# Patient Record
Sex: FEMALE | Race: BLACK OR AFRICAN AMERICAN | NOT HISPANIC OR LATINO | URBAN - METROPOLITAN AREA
[De-identification: names, ages, dates, MRNs, and addresses within clinical notes are randomized per-mention and may not be internally consistent; named-entity substitution may affect disease eponyms.]

---

## 2018-08-09 ENCOUNTER — EMERGENCY (EMERGENCY)
Facility: HOSPITAL | Age: 23
LOS: 1 days | Discharge: HOME | End: 2018-08-09
Attending: EMERGENCY MEDICINE
Payer: SELF-PAY

## 2018-08-09 VITALS
RESPIRATION RATE: 18 BRPM | TEMPERATURE: 100 F | SYSTOLIC BLOOD PRESSURE: 109 MMHG | HEART RATE: 96 BPM | OXYGEN SATURATION: 97 % | DIASTOLIC BLOOD PRESSURE: 70 MMHG

## 2018-08-09 PROCEDURE — 99282 EMERGENCY DEPT VISIT SF MDM: CPT

## 2018-08-09 PROCEDURE — 99283 EMERGENCY DEPT VISIT LOW MDM: CPT | Mod: 25

## 2018-08-09 PROCEDURE — 99053 MED SERV 10PM-8AM 24 HR FAC: CPT

## 2018-08-09 NOTE — ED PROVIDER NOTE - PROGRESS NOTE DETAILS
Patient decided she could not wait. Did not want labs or imaging done. Offered meds, patient would prefer to leave. No concern for emergent condition requiring immediate intervention. Return as needed

## 2018-08-09 NOTE — ED ADULT NURSE NOTE - NSIMPLEMENTINTERV_GEN_ALL_ED
Implemented All Fall Risk Interventions:  [place] to call system. Call bell, personal items and telephone within reach. Instruct patient to call for assistance. Room bathroom lighting operational. Non-slip footwear when patient is off stretcher. Physically safe environment: no spills, clutter or unnecessary equipment. Stretcher in lowest position, wheels locked, appropriate side rails in place. Provide visual cue, wrist band, yellow gown, etc. Monitor gait and stability. Monitor for mental status changes and reorient to person, place, and time. Review medications for side effects contributing to fall risk. Reinforce activity limits and safety measures with patient and family.

## 2018-08-09 NOTE — ED PROVIDER NOTE - ATTENDING CONTRIBUTION TO CARE
Patricia Freitas MD - Attending Physician: I have personally seen and examined this patient with the resident/fellow.  I have fully participated in the care of this patient. I have reviewed all pertinent clinical information, including history, physical exam, plan and the Resident/Fellow’s note and agree except as noted. See MDM

## 2018-08-09 NOTE — ED PROVIDER NOTE - OBJECTIVE STATEMENT
24yo female no pmh presenting with epigastric pain for 3 days occasionally radiating to right side. Patient afraid to eat, vomited once, occasional nausea, one episode of loose stools. Feels acid taste in mouth at times, feels bloated. Denies fevers, chills.

## 2018-08-09 NOTE — ED PROVIDER NOTE - PHYSICAL EXAMINATION
Gen: No acute distress, alert, cooperative  Head: Normocephalic, Atraumatic  HEENT: normal conjunctiva  Lung: CTAB, no respiratory distress, no crackles or wheezes  CV: rrr, no murmur  Abd: soft, MIld epigastric tenderness, ND, no rebound or guarding  MSK: No LE edema  Neuro: No focal neurologic deficits  Skin: Warm and dry, no evidence of rash   Psych: normal affect, follows commands

## 2018-08-09 NOTE — ED PROVIDER NOTE - MEDICAL DECISION MAKING DETAILS
24yo female no pmh presenting with epigastric pain for 3 days. Possibly gerd. Patient declines all treatment at this time, explained all possible diagnosis, possible workup options. Patient declines, labs, imaging, medications. Scared to miss work in AM, will return if symptoms worsen,. 22yo female no pmh presenting with epigastric pain for 3 days. Possibly gerd. Patient declines all treatment at this time, explained all possible diagnosis, possible workup options. Patient declines, labs, imaging, medications. Scared to miss work in AM, will return if symptoms worsen,.    Patricia Freitas MD - Attending Physician: Pt here with epigastric pain. History c/w gastritis. Declined labs, meds at this time. Discussed follow-up, outpatient care, and return precautions

## 2018-08-09 NOTE — ED ADULT NURSE NOTE - OBJECTIVE STATEMENT
abd pain x3d radiating to rt side, no nvd, no co no sob aox3 maex4 skin intact neuro intact no fever

## 2019-08-31 ENCOUNTER — EMERGENCY (EMERGENCY)
Facility: HOSPITAL | Age: 24
LOS: 1 days | Discharge: HOME | End: 2019-08-31
Attending: EMERGENCY MEDICINE | Admitting: EMERGENCY MEDICINE
Payer: MEDICAID

## 2019-08-31 VITALS
SYSTOLIC BLOOD PRESSURE: 117 MMHG | HEART RATE: 75 BPM | OXYGEN SATURATION: 99 % | TEMPERATURE: 98 F | RESPIRATION RATE: 16 BRPM | DIASTOLIC BLOOD PRESSURE: 69 MMHG

## 2019-08-31 VITALS
DIASTOLIC BLOOD PRESSURE: 52 MMHG | TEMPERATURE: 99 F | SYSTOLIC BLOOD PRESSURE: 101 MMHG | RESPIRATION RATE: 19 BRPM | OXYGEN SATURATION: 98 % | HEART RATE: 60 BPM

## 2019-08-31 PROCEDURE — 76705 ECHO EXAM OF ABDOMEN: CPT | Mod: 26

## 2019-08-31 PROCEDURE — 99284 EMERGENCY DEPT VISIT MOD MDM: CPT

## 2019-08-31 RX ORDER — LIDOCAINE 4 G/100G
5 CREAM TOPICAL ONCE
Refills: 0 | Status: COMPLETED | OUTPATIENT
Start: 2019-08-31 | End: 2019-08-31

## 2019-08-31 RX ORDER — ONDANSETRON 8 MG/1
4 TABLET, FILM COATED ORAL ONCE
Refills: 0 | Status: COMPLETED | OUTPATIENT
Start: 2019-08-31 | End: 2019-08-31

## 2019-08-31 RX ADMIN — ONDANSETRON 4 MILLIGRAM(S): 8 TABLET, FILM COATED ORAL at 20:45

## 2019-08-31 RX ADMIN — Medication 30 MILLILITER(S): at 20:45

## 2019-08-31 RX ADMIN — LIDOCAINE 5 MILLILITER(S): 4 CREAM TOPICAL at 20:45

## 2019-08-31 NOTE — ED PROVIDER NOTE - OBJECTIVE STATEMENT
25 y/o F w/ no pertinent PMH, presents to the ED c/o vague abdominal discomfort localized over epigastric and RUQ area. pt states she had chipotle twice this month w/ abdominal discomfort. Pt requesting work note so she does not have to do heavy lifting at work tomorrow. Pt admits to daily marijuana smoking.

## 2019-08-31 NOTE — ED PROVIDER NOTE - PATIENT PORTAL LINK FT
You can access the FollowMyHealth Patient Portal offered by [facility] by registering at the following website: [email or web address]. By joining [employer]’s FollowMyHealth portal, you will also be able to view your health information using other applications (apps) compatible with our system.

## 2019-08-31 NOTE — ED ADULT NURSE NOTE - OBJECTIVE STATEMENT
pt is A&Ox3, ambulatory, able to make needs known and presents with C/O ab pain with nausea and what PT describes as "muscle spasms". Has intermittent nausea but denies vomiting x 3 days.

## 2019-08-31 NOTE — ED PROVIDER NOTE - CLINICAL SUMMARY MEDICAL DECISION MAKING FREE TEXT BOX
25 y/o F p/w vague abdominal discomfort. Plan: GI cocktail, RUQ ultrasound to r/o ailyn and dc w/ work note.

## 2020-06-11 ENCOUNTER — EMERGENCY (EMERGENCY)
Facility: HOSPITAL | Age: 25
LOS: 1 days | Discharge: HOME | End: 2020-06-11
Attending: EMERGENCY MEDICINE | Admitting: EMERGENCY MEDICINE
Payer: MEDICAID

## 2020-06-11 VITALS
DIASTOLIC BLOOD PRESSURE: 66 MMHG | TEMPERATURE: 98 F | SYSTOLIC BLOOD PRESSURE: 109 MMHG | RESPIRATION RATE: 18 BRPM | OXYGEN SATURATION: 99 % | HEART RATE: 71 BPM

## 2020-06-11 VITALS
RESPIRATION RATE: 18 BRPM | HEART RATE: 59 BPM | SYSTOLIC BLOOD PRESSURE: 109 MMHG | DIASTOLIC BLOOD PRESSURE: 55 MMHG | OXYGEN SATURATION: 99 % | TEMPERATURE: 98 F

## 2020-06-11 PROCEDURE — 99283 EMERGENCY DEPT VISIT LOW MDM: CPT

## 2020-06-11 PROCEDURE — 71046 X-RAY EXAM CHEST 2 VIEWS: CPT | Mod: 26

## 2020-06-11 PROCEDURE — 73562 X-RAY EXAM OF KNEE 3: CPT | Mod: 26,RT

## 2020-06-11 PROCEDURE — 72170 X-RAY EXAM OF PELVIS: CPT | Mod: 26

## 2020-06-11 NOTE — ED PROVIDER NOTE - NSFOLLOWUPINSTRUCTIONS_ED_ALL_ED_FT
Please call or see your doctor or return to the ER if you have new chest pain, shortness of breath, fevers, inability to eat or drink, or worsening of symptoms that brought you to the emergency room today.    Take motrin as needed for pain.     Please follow up with your primary care physician in 1-2 days or as soon as possible.
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
## 2020-06-11 NOTE — ED PROVIDER NOTE - MUSCULOSKELETAL, MLM
No vertebral stepoffs; pelvis stable; small ecchymosis noted to R lateral knee. LE strength and sensation strength intact b/l with hip ROMintact b/l. No flank hematomas noted.
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
## 2020-06-11 NOTE — ED PROVIDER NOTE - PATIENT PORTAL LINK FT
You can access the FollowMyHealth Patient Portal offered by [facility] by registering at the following website: [email or web address]. By joining [employer]’s FollowMyHealth portal, you will also be able to view your health information using other applications (apps) compatible with our system.
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
## 2020-06-11 NOTE — ED ADULT NURSE NOTE - NSIMPLEMENTINTERV_GEN_ALL_ED
Implemented All Fall Risk Interventions:  [place] to call system. Call bell, personal items and telephone within reach. Instruct patient to call for assistance. Room bathroom lighting operational. Non-slip footwear when patient is off stretcher. Physically safe environment: no spills, clutter or unnecessary equipment. Stretcher in lowest position, wheels locked, appropriate side rails in place. Provide visual cue, wrist band, yellow gown, etc. Monitor gait and stability. Monitor for mental status changes and reorient to person, place, and time. Review medications for side effects contributing to fall risk. Reinforce activity limits and safety measures with patient and family.
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
## 2020-09-22 NOTE — ED ADULT NURSE NOTE - MODE OF DISCHARGE
Patient identified as New Medicare Bundle through report  Email with bundle communication tool sent to facility with bundle dates, DRG, and LOS  This care manager assistant will continue to monitor via chart and CarePort review throughout bundle episode 
Ambulatory

## 2022-04-15 ENCOUNTER — OUTPATIENT (OUTPATIENT)
Dept: INPATIENT UNIT | Facility: HOSPITAL | Age: 27
LOS: 1 days | Discharge: HOME | End: 2022-04-15

## 2022-04-15 ENCOUNTER — EMERGENCY (EMERGENCY)
Facility: HOSPITAL | Age: 27
LOS: 1 days | Discharge: STILL ADMITTED OR ADMINISTRATIVE | End: 2022-04-15
Admitting: EMERGENCY MEDICINE
Payer: MEDICAID

## 2022-04-15 VITALS
SYSTOLIC BLOOD PRESSURE: 107 MMHG | HEART RATE: 66 BPM | DIASTOLIC BLOOD PRESSURE: 57 MMHG | TEMPERATURE: 98 F | RESPIRATION RATE: 16 BRPM

## 2022-04-15 VITALS — DIASTOLIC BLOOD PRESSURE: 79 MMHG | SYSTOLIC BLOOD PRESSURE: 126 MMHG | HEART RATE: 60 BPM

## 2022-04-15 VITALS
RESPIRATION RATE: 18 BRPM | OXYGEN SATURATION: 100 % | SYSTOLIC BLOOD PRESSURE: 116 MMHG | HEART RATE: 74 BPM | TEMPERATURE: 98 F | DIASTOLIC BLOOD PRESSURE: 46 MMHG

## 2022-04-15 DIAGNOSIS — O26.899 OTHER SPECIFIED PREGNANCY RELATED CONDITIONS, UNSPECIFIED TRIMESTER: ICD-10-CM

## 2022-04-15 DIAGNOSIS — Z3A.00 WEEKS OF GESTATION OF PREGNANCY NOT SPECIFIED: ICD-10-CM

## 2022-04-15 PROCEDURE — L9996: CPT

## 2022-04-15 PROCEDURE — 99213 OFFICE O/P EST LOW 20 MIN: CPT

## 2022-04-15 NOTE — OB PROVIDER TRIAGE NOTE - NSICDXPASTMEDICALHX_GEN_ALL_CORE_FT
PAST MEDICAL HISTORY:  HSV-1 infection     No pertinent past medical history     No pertinent past medical history

## 2022-04-15 NOTE — OB RN TRIAGE NOTE - NSICDXPASTMEDICALHX_GEN_ALL_CORE_FT
PAST MEDICAL HISTORY:  No pertinent past medical history     No pertinent past medical history      PAST MEDICAL HISTORY:  HSV-1 infection     No pertinent past medical history     No pertinent past medical history

## 2022-04-15 NOTE — OB PROVIDER TRIAGE NOTE - NS_OBGYNHISTORY_OBGYN_ALL_OB_FT
GYN: Yeast infection diagnosed 1 weeks ago. Pt using terconazole cream for treatment, HSV1 as per HIE, patient didn't disclose   OB: Denies    AP course uncomplicated  -GBS Unknown

## 2022-04-15 NOTE — OB PROVIDER TRIAGE NOTE - HISTORY OF PRESENT ILLNESS
28y/o  @39wks presents with leaking of fluid since 5pm and contractions felt every 10mins. Patient is currently taking terconazole intravaginally for a yeast infection, last dose  pm  Reports good fetal movement  Denies VB    Allergies: Denies  Medications: PNV, Terconazole    Denies Medical and Surgical HX  Denies Etoh/Smoke/Drugs/Psy HX

## 2022-04-15 NOTE — ED ADULT TRIAGE NOTE - CHIEF COMPLAINT QUOTE
Pt st" I am 38 weeks due day is April 22, this is my first baby, I started leaking fluid yesterday around 5pm and having contractions every 10 minutes." Pt reports active fetal movement.

## 2022-04-15 NOTE — OB RN TRIAGE NOTE - PATIENT'S GENDER IDENTITY
PCP: Gisela Cleaning MD     Last appt: 3/23/2021   Future Appointments   Date Time Provider Gosia Rogers   6/23/2021 11:30 AM Gisela Cleaning MD [facility]   4/4/2022 11:00 AM Jossy Monahan MD [facility] BS AMB        Requested Prescriptions     Pending Prescriptions Disp Refills    metoprolol succinate (TOPROL-XL) 25 mg XL tablet 90 Tab 3     Sig: Take 1 Tab by mouth daily.  Indications: high blood pressure and heart Female

## 2022-04-15 NOTE — OB PROVIDER TRIAGE NOTE - NSHPPHYSICALEXAM_GEN_ALL_CORE
Vital Signs Last 24 Hrs  T(C): 36.7 (15 Apr 2022 05:16), Max: 36.7 (15 Apr 2022 05:09)  T(F): 98.06 (15 Apr 2022 05:16), Max: 98.1 (15 Apr 2022 05:09)  HR: 66 (15 Apr 2022 05:16) (66 - 74)  BP: 107/57 (15 Apr 2022 05:16) (107/57 - 116/46)  RR: 16 (15 Apr 2022 05:09) (16 - 18)  SpO2: 100% (15 Apr 2022 04:28) (100% - 100%)    Assessment reveals VSS  Abdomen soft, NT, gravid  Cat 1 tracing, ctx every 10 mins  Transabdominal Ultrasound- post placenta, jeff: 9.24, bpp8/8, vtx  Sterile Speculum- cervix appears closed, +leukorrhea in the vault, SSE negative for lesions   Vaginal Exam- 1/40/-3  A&Ox3  Lungs- clear bilateral  Heart- normal rate and rhythm      PLAN: NST Continues

## 2022-04-15 NOTE — OB PROVIDER TRIAGE NOTE - PLAN OF CARE
D/C Home  D/W Dr. Griggs  No evidence of SROM or Labor at this time  Normal fetal testing  Follow up at next scheduled prenatal appointment 4/18  Return for decreased fetal movement, loss of fluid or vaginal bleeding  Increase oral hydration   Signs and Symptoms of labor reviewed

## 2022-04-15 NOTE — OB PROVIDER TRIAGE NOTE - ADDITIONAL INSTRUCTIONS
Follow up at next scheduled prenatal appointment 4/18  Return for decreased fetal movement, loss of fluid or vaginal bleeding  Increase oral hydration   Signs and Symptoms of labor reviewed

## 2022-04-15 NOTE — OB RN TRIAGE NOTE - FALL HARM RISK - UNIVERSAL INTERVENTIONS
Bed in lowest position, wheels locked, appropriate side rails in place/Call bell, personal items and telephone in reach/Instruct patient to call for assistance before getting out of bed or chair/Non-slip footwear when patient is out of bed/[place] to call system/Physically safe environment - no spills, clutter or unnecessary equipment/Purposeful Proactive Rounding/Room/bathroom lighting operational, light cord in reach

## 2022-04-17 ENCOUNTER — INPATIENT (INPATIENT)
Facility: HOSPITAL | Age: 27
LOS: 2 days | Discharge: HOME | End: 2022-04-20
Attending: OBSTETRICS & GYNECOLOGY | Admitting: OBSTETRICS & GYNECOLOGY

## 2022-04-17 VITALS
HEART RATE: 61 BPM | SYSTOLIC BLOOD PRESSURE: 120 MMHG | RESPIRATION RATE: 18 BRPM | DIASTOLIC BLOOD PRESSURE: 77 MMHG | TEMPERATURE: 98 F

## 2022-04-17 DIAGNOSIS — Z3A.00 WEEKS OF GESTATION OF PREGNANCY NOT SPECIFIED: ICD-10-CM

## 2022-04-17 DIAGNOSIS — O26.899 OTHER SPECIFIED PREGNANCY RELATED CONDITIONS, UNSPECIFIED TRIMESTER: ICD-10-CM

## 2022-04-17 PROBLEM — B00.9 HERPESVIRAL INFECTION, UNSPECIFIED: Chronic | Status: ACTIVE | Noted: 2022-04-15

## 2022-04-17 LAB
BASOPHILS # BLD AUTO: 0.02 K/UL — SIGNIFICANT CHANGE UP (ref 0–0.2)
BASOPHILS NFR BLD AUTO: 0.3 % — SIGNIFICANT CHANGE UP (ref 0–2)
BLD GP AB SCN SERPL QL: NEGATIVE — SIGNIFICANT CHANGE UP
COVID-19 SPIKE DOMAIN AB INTERP: POSITIVE
COVID-19 SPIKE DOMAIN ANTIBODY RESULT: 12.2 U/ML — HIGH
EOSINOPHIL # BLD AUTO: 0.02 K/UL — SIGNIFICANT CHANGE UP (ref 0–0.5)
EOSINOPHIL NFR BLD AUTO: 0.3 % — SIGNIFICANT CHANGE UP (ref 0–6)
HCT VFR BLD CALC: 35.9 % — SIGNIFICANT CHANGE UP (ref 34.5–45)
HGB BLD-MCNC: 11.7 G/DL — SIGNIFICANT CHANGE UP (ref 11.5–15.5)
IANC: 4.51 K/UL — SIGNIFICANT CHANGE UP (ref 1.8–7.4)
IMM GRANULOCYTES NFR BLD AUTO: 0.3 % — SIGNIFICANT CHANGE UP (ref 0–1.5)
LYMPHOCYTES # BLD AUTO: 2.3 K/UL — SIGNIFICANT CHANGE UP (ref 1–3.3)
LYMPHOCYTES # BLD AUTO: 32 % — SIGNIFICANT CHANGE UP (ref 13–44)
MCHC RBC-ENTMCNC: 29.3 PG — SIGNIFICANT CHANGE UP (ref 27–34)
MCHC RBC-ENTMCNC: 32.6 GM/DL — SIGNIFICANT CHANGE UP (ref 32–36)
MCV RBC AUTO: 90 FL — SIGNIFICANT CHANGE UP (ref 80–100)
MONOCYTES # BLD AUTO: 0.31 K/UL — SIGNIFICANT CHANGE UP (ref 0–0.9)
MONOCYTES NFR BLD AUTO: 4.3 % — SIGNIFICANT CHANGE UP (ref 2–14)
NEUTROPHILS # BLD AUTO: 4.51 K/UL — SIGNIFICANT CHANGE UP (ref 1.8–7.4)
NEUTROPHILS NFR BLD AUTO: 62.8 % — SIGNIFICANT CHANGE UP (ref 43–77)
NRBC # BLD: 0 /100 WBCS — SIGNIFICANT CHANGE UP
NRBC # FLD: 0 K/UL — SIGNIFICANT CHANGE UP
PLATELET # BLD AUTO: 377 K/UL — SIGNIFICANT CHANGE UP (ref 150–400)
RBC # BLD: 3.99 M/UL — SIGNIFICANT CHANGE UP (ref 3.8–5.2)
RBC # FLD: 14.7 % — HIGH (ref 10.3–14.5)
RH IG SCN BLD-IMP: POSITIVE — SIGNIFICANT CHANGE UP
RH IG SCN BLD-IMP: POSITIVE — SIGNIFICANT CHANGE UP
SARS-COV-2 IGG+IGM SERPL QL IA: 12.2 U/ML — HIGH
SARS-COV-2 IGG+IGM SERPL QL IA: POSITIVE
SARS-COV-2 RNA SPEC QL NAA+PROBE: SIGNIFICANT CHANGE UP
WBC # BLD: 7.18 K/UL — SIGNIFICANT CHANGE UP (ref 3.8–10.5)
WBC # FLD AUTO: 7.18 K/UL — SIGNIFICANT CHANGE UP (ref 3.8–10.5)

## 2022-04-17 RX ORDER — CITRIC ACID/SODIUM CITRATE 300-500 MG
15 SOLUTION, ORAL ORAL EVERY 6 HOURS
Refills: 0 | Status: DISCONTINUED | OUTPATIENT
Start: 2022-04-17 | End: 2022-04-17

## 2022-04-17 RX ORDER — CITRIC ACID/SODIUM CITRATE 300-500 MG
15 SOLUTION, ORAL ORAL EVERY 6 HOURS
Refills: 0 | Status: DISCONTINUED | OUTPATIENT
Start: 2022-04-17 | End: 2022-04-18

## 2022-04-17 RX ORDER — SODIUM CHLORIDE 9 MG/ML
1000 INJECTION, SOLUTION INTRAVENOUS
Refills: 0 | Status: DISCONTINUED | OUTPATIENT
Start: 2022-04-17 | End: 2022-04-18

## 2022-04-17 RX ORDER — OXYTOCIN 10 UNIT/ML
333.33 VIAL (ML) INJECTION
Qty: 20 | Refills: 0 | Status: DISCONTINUED | OUTPATIENT
Start: 2022-04-17 | End: 2022-04-20

## 2022-04-17 RX ORDER — SODIUM CHLORIDE 9 MG/ML
1000 INJECTION, SOLUTION INTRAVENOUS
Refills: 0 | Status: DISCONTINUED | OUTPATIENT
Start: 2022-04-17 | End: 2022-04-17

## 2022-04-17 RX ORDER — BUTORPHANOL TARTRATE 2 MG/ML
2 INJECTION, SOLUTION INTRAMUSCULAR; INTRAVENOUS ONCE
Refills: 0 | Status: DISCONTINUED | OUTPATIENT
Start: 2022-04-17 | End: 2022-04-17

## 2022-04-17 RX ORDER — OXYTOCIN 10 UNIT/ML
333.33 VIAL (ML) INJECTION
Qty: 20 | Refills: 0 | Status: DISCONTINUED | OUTPATIENT
Start: 2022-04-17 | End: 2022-04-17

## 2022-04-17 RX ORDER — OXYTOCIN 10 UNIT/ML
2 VIAL (ML) INJECTION
Qty: 30 | Refills: 0 | Status: DISCONTINUED | OUTPATIENT
Start: 2022-04-17 | End: 2022-04-18

## 2022-04-17 RX ADMIN — SODIUM CHLORIDE 125 MILLILITER(S): 9 INJECTION, SOLUTION INTRAVENOUS at 15:26

## 2022-04-17 RX ADMIN — Medication 2 MILLIUNIT(S)/MIN: at 16:39

## 2022-04-17 NOTE — OB PROVIDER H&P - HISTORY OF PRESENT ILLNESS
R1 Admission H&P    Subjective  HPI: 27y G1P- at 39w2d presents for painful contractions and continued leakage of fluid for 2 days. +FM. +LOF. +CTXs. -VB. Pt denies any other concerns.    States that a few days ago, felt like she began leaking fluid. Has not needed to use a pad, but feels it on her underwear. Denies gush of fluid. Since her last triage visit, has not changed in terms of amount of leakage. Additionally endorses painful contractions 10/10 now every 4-5 mins for the past two days.    – PNC: Denies prenatal issues. GBS unknown.  EFW 2921g by extrapolation from sono 2721g 1wk prior.  – OBHx: denies  – GynHx: denies fibroids, cysts, endometriosis, abnormal pap smears  – PMH: denies  – PSH: denies  – Psych: denies   – Social: denies   – Meds: PNV   – Allergies: NKDA  – Will accept blood transfusions? Yes    Objective  – VS  T(C): 36.4 (04-17-22 @ 07:57)  HR: 55 (04-17-22 @ 08:41)  BP: 127/76 (04-17-22 @ 08:41)  RR: 18 (04-17-22 @ 07:39)  SpO2: --    Physical Exam  CV: RRR  Pulm: breathing comfortably on RA  Abd: gravid, nontender  Extr: moving all extremities with ease    – Spec: (-) pooling, (-) ferning, (-) nitrazine  – VE: 1/80/-3  – FHT: baseline 120, mod variability, +accels, -decels  – [place]: q 5 min  – EFW: 2921g by extrapolation from sono  – Sono: vertex R1 Admission H&P    Subjective  HPI: 27y  at 39w2d presents for painful contractions and continued leakage of fluid for 2 days. +FM. +LOF. +CTXs. -VB. Pt denies any other concerns.    States that a few days ago, felt like she began leaking fluid. Has not needed to use a pad, but feels it on her underwear. Denies gush of fluid. Since her last triage visit, has not changed in terms of amount of leakage. Additionally endorses painful contractions 10/10 now every 4-5 mins for the past two days.    – PNC: Denies prenatal issues. GBS negative.  EFW 2921g by extrapolation from sono 2721g 1wk prior.  – OBHx: denies  – GynHx: denies fibroids, cysts, endometriosis, abnormal pap smears  – PMH: denies  – PSH: denies  – Psych: denies   – Social: denies   – Meds: PNV   – Allergies: NKDA  – Will accept blood transfusions? Yes    Objective  – VS  T(C): 36.4 (22 @ 07:57)  HR: 55 (22 @ 08:41)  BP: 127/76 (22 @ 08:41)  RR: 18 (22 @ 07:39)  SpO2: --    Physical Exam  CV: RRR  Pulm: breathing comfortably on RA  Abd: gravid, nontender  Extr: moving all extremities with ease    – Spec: (-) pooling, (-) ferning, (-) nitrazine  – VE: 1/80/-3  – FHT: baseline 120, mod variability, +accels, -decels  – [place]: q 5 min  – EFW: 2921g by extrapolation from sono  – Sono: vertex R1 Admission H&P    Subjective  HPI: 27y  at 39w2d presents for painful contractions and continued leakage of fluid for 2 days. +FM. +LOF. +CTXs. -VB. Pt denies any other concerns.    States that a few days ago, felt like she began leaking fluid. Has not needed to use a pad, but feels it on her underwear. Denies gush of fluid. Since her last triage visit, has not changed in terms of amount of leakage. Additionally endorses painful contractions 10/10 now every 4-5 mins for the past two days.    – PNC: Denies prenatal issues. GBS negative.  EFW 2921g by extrapolation from sono 2721g 1wk prior.  – OBHx: denies  – GynHx: denies fibroids, cysts, endometriosis, abnormal pap smears  – PMH: denies  – PSH: denies  – Psych: denies   – Social: denies   – Meds: PNV   – Allergies: NKDA  – Will accept blood transfusions? Yes    Objective  – VS  T(C): 36.4 (22 @ 07:57)  HR: 55 (22 @ 08:41)  BP: 127/76 (22 @ 08:41)  RR: 18 (22 @ 07:39)  SpO2: --    Physical Exam  CV: RRR  Pulm: breathing comfortably on RA  Abd: gravid, nontender  Extr: moving all extremities with ease    – Spec: (-) pooling, (-) ferning, (-) nitrazine  – VE: 1/80/-3  – FHT: baseline 120, mod variability, +accels, -decels  – [place]: q 5 min  – EFW: 2921g by extrapolation from sono  – Sono: vertex, JOSE 2

## 2022-04-17 NOTE — OB PROVIDER H&P - ASSESSMENT
Assessment  27y  at 39w2d presents for painful contractions; admit for oligo induction w/ JOSE 2.    Plan  1. Admit to L+D. Routine Labs. IVF.  2. IOL with pitocin.  3. Fetus: cat 1 tracing. VTX. EFW 2921g by sono. Continuous EFM. Sono. No concerns.  4. Prenatal issues: none  5. GBS (unknown)  6. Pain: IV pain meds/epidural PRN    Plan per attending physician,  ____    Maci Garcia MD  PGY1 Assessment  27y  at 39w2d presents for painful contractions; admit for oligo induction w/ JOSE 2.    Plan  1. Admit to L+D. Routine Labs. IVF.  2. IOL with PO cytotec.  3. Fetus: cat 1 tracing. VTX. EFW 2921g by sono. Continuous EFM. Sono. No concerns.  4. Prenatal issues: none  5. GBS neg  6. Pain: IV pain meds/epidural PRN    Plan per attending physician, Dr. Abhinav Garcia MD  PGY1

## 2022-04-17 NOTE — OB RN PATIENT PROFILE - FALL HARM RISK - UNIVERSAL INTERVENTIONS
Bed in lowest position, wheels locked, appropriate side rails in place/Call bell, personal items and telephone in reach/Instruct patient to call for assistance before getting out of bed or chair/Non-slip footwear when patient is out of bed/[place] to call system/Physically safe environment - no spills, clutter or unnecessary equipment/Purposeful Proactive Rounding/Room/bathroom lighting operational, light cord in reach

## 2022-04-17 NOTE — CHART NOTE - NSCHARTNOTEFT_GEN_A_CORE
Notified by RN that patient is requesting to turn pitocin off    I went in to discuss concerns regarding induction and use of pitocin with patient.     Pt and partner expressed that they read on internet that pitocin makes the baby's tracing flat, that pitocin doesn't allow baby to receive oxygen, among other things. Also expressed that they believed that patient was making more cervical change without the pitocin than with it. They believe that overall, the patient and baby are better off without the pitocin than with it because it is a synthetic hormone that is interfering with patient's body and labor.     I discussed with patient the risks of oligohydramnios, including risk of placental abruption and fetal demise. I explained to them that the recommendation by ACOG is to induce labor in these situations. Explained to them mechanism of action of pitocin and that the impacts seen on tracing from pitocin are likely the same as the changes that would be observed in labor. Explained to them that olighydramnios in itself can cause poor FHT.    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 Notified by RN that patient is requesting to turn pitocin off    I went in to discuss concerns regarding induction and use of pitocin with patient.     Pt and partner expressed that they read on internet that pitocin makes the baby's tracing flat, that pitocin doesn't allow baby to receive oxygen, among other things. Also expressed that they believed that patient was making more cervical change without the pitocin than with it. They believe that overall, the patient and baby are better off without the pitocin than with it because it is a synthetic hormone that is interfering with patient's body and labor.     I discussed with patient the risks of oligohydramnios, including risk of placental abruption and fetal demise. I explained to them that the recommendation by ACOG is to induce labor in these situations. Explained to them mechanism of action of pitocin and that the impacts seen on tracing from pitocin are likely the same as the changes that would be observed in labor. Explained to them that olighydramnios in itself can cause poor FHT.    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 Pt and partner remained insistent on not wanting the pitocin, believing that the pitocin is worse for mom and baby than the oligohydramnios. Pt Notified by RN that patient is requesting to turn pitocin off    I went in to discuss concerns regarding induction and use of pitocin with patient.     Pt and partner expressed that they read on internet that pitocin makes the baby's tracing flat, that pitocin doesn't allow baby to receive oxygen, among other things. Also expressed that they believed that patient was making more cervical change without the pitocin than with it. They believe that overall, the patient and baby are better off without the pitocin than with it because it is a synthetic hormone that is interfering with patient's body and labor.     I discussed with patient the risks of oligohydramnios, including risk of placental abruption and fetal demise. I explained to them that the recommendation by ACOG is to induce labor in these situations. Explained to them mechanism of action of pitocin and that the impacts seen on tracing from pitocin are likely the same as the changes that would be observed in labor. Explained to them that olighydramnios in itself can cause poor FHT.    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 Pt and partner remained insistent on not wanting the pitocin, believing that the pitocin is worse for mom and baby than the oligohydramnios. Pt and partner informed that if they refuse the pitocin, they are refusing induction and medical advice and would need to sign AMA paperwork. Pt and partner given time to consider options and inform medical team when a decision had been reached.     Spent 30+ minutes in room counseling patient and partner.   d/w Dr. Worrell and Dr. Ty, PGY-4    RICO Stephens, PGY-1

## 2022-04-17 NOTE — OB RN TRIAGE NOTE - CHIEF COMPLAINT QUOTE
contractions q 5 minutes and leaking fluid x2 days.  "I was here at hospital 2 days ago and they told me that I did not break my water."

## 2022-04-18 ENCOUNTER — TRANSCRIPTION ENCOUNTER (OUTPATIENT)
Age: 27
End: 2022-04-18

## 2022-04-18 LAB — T PALLIDUM AB TITR SER: NEGATIVE — SIGNIFICANT CHANGE UP

## 2022-04-18 RX ORDER — OXYCODONE HYDROCHLORIDE 5 MG/1
5 TABLET ORAL
Refills: 0 | Status: DISCONTINUED | OUTPATIENT
Start: 2022-04-18 | End: 2022-04-20

## 2022-04-18 RX ORDER — DIPHENHYDRAMINE HCL 50 MG
25 CAPSULE ORAL EVERY 6 HOURS
Refills: 0 | Status: DISCONTINUED | OUTPATIENT
Start: 2022-04-18 | End: 2022-04-20

## 2022-04-18 RX ORDER — OXYCODONE HYDROCHLORIDE 5 MG/1
5 TABLET ORAL ONCE
Refills: 0 | Status: DISCONTINUED | OUTPATIENT
Start: 2022-04-18 | End: 2022-04-20

## 2022-04-18 RX ORDER — PRAMOXINE HYDROCHLORIDE 150 MG/15G
1 AEROSOL, FOAM RECTAL EVERY 4 HOURS
Refills: 0 | Status: DISCONTINUED | OUTPATIENT
Start: 2022-04-18 | End: 2022-04-20

## 2022-04-18 RX ORDER — OXYTOCIN 10 UNIT/ML
333.33 VIAL (ML) INJECTION
Qty: 20 | Refills: 0 | Status: DISCONTINUED | OUTPATIENT
Start: 2022-04-18 | End: 2022-04-20

## 2022-04-18 RX ORDER — IBUPROFEN 200 MG
1 TABLET ORAL
Qty: 0 | Refills: 0 | DISCHARGE
Start: 2022-04-18

## 2022-04-18 RX ORDER — LANOLIN
1 OINTMENT (GRAM) TOPICAL EVERY 6 HOURS
Refills: 0 | Status: DISCONTINUED | OUTPATIENT
Start: 2022-04-18 | End: 2022-04-20

## 2022-04-18 RX ORDER — DIBUCAINE 1 %
1 OINTMENT (GRAM) RECTAL EVERY 6 HOURS
Refills: 0 | Status: DISCONTINUED | OUTPATIENT
Start: 2022-04-18 | End: 2022-04-20

## 2022-04-18 RX ORDER — IBUPROFEN 200 MG
600 TABLET ORAL EVERY 6 HOURS
Refills: 0 | Status: DISCONTINUED | OUTPATIENT
Start: 2022-04-18 | End: 2022-04-20

## 2022-04-18 RX ORDER — IBUPROFEN 200 MG
600 TABLET ORAL EVERY 6 HOURS
Refills: 0 | Status: COMPLETED | OUTPATIENT
Start: 2022-04-18 | End: 2023-03-17

## 2022-04-18 RX ORDER — ACETAMINOPHEN 500 MG
2 TABLET ORAL
Qty: 0 | Refills: 0 | DISCHARGE
Start: 2022-04-18

## 2022-04-18 RX ORDER — KETOROLAC TROMETHAMINE 30 MG/ML
30 SYRINGE (ML) INJECTION ONCE
Refills: 0 | Status: DISCONTINUED | OUTPATIENT
Start: 2022-04-18 | End: 2022-04-20

## 2022-04-18 RX ORDER — MAGNESIUM HYDROXIDE 400 MG/1
30 TABLET, CHEWABLE ORAL
Refills: 0 | Status: DISCONTINUED | OUTPATIENT
Start: 2022-04-18 | End: 2022-04-20

## 2022-04-18 RX ORDER — TETANUS TOXOID, REDUCED DIPHTHERIA TOXOID AND ACELLULAR PERTUSSIS VACCINE, ADSORBED 5; 2.5; 8; 8; 2.5 [IU]/.5ML; [IU]/.5ML; UG/.5ML; UG/.5ML; UG/.5ML
0.5 SUSPENSION INTRAMUSCULAR ONCE
Refills: 0 | Status: DISCONTINUED | OUTPATIENT
Start: 2022-04-18 | End: 2022-04-20

## 2022-04-18 RX ORDER — SIMETHICONE 80 MG/1
80 TABLET, CHEWABLE ORAL EVERY 4 HOURS
Refills: 0 | Status: DISCONTINUED | OUTPATIENT
Start: 2022-04-18 | End: 2022-04-20

## 2022-04-18 RX ORDER — AER TRAVELER 0.5 G/1
1 SOLUTION RECTAL; TOPICAL EVERY 4 HOURS
Refills: 0 | Status: DISCONTINUED | OUTPATIENT
Start: 2022-04-18 | End: 2022-04-20

## 2022-04-18 RX ORDER — HYDROCORTISONE 1 %
1 OINTMENT (GRAM) TOPICAL EVERY 6 HOURS
Refills: 0 | Status: DISCONTINUED | OUTPATIENT
Start: 2022-04-18 | End: 2022-04-20

## 2022-04-18 RX ORDER — BENZOCAINE 10 %
1 GEL (GRAM) MUCOUS MEMBRANE EVERY 6 HOURS
Refills: 0 | Status: DISCONTINUED | OUTPATIENT
Start: 2022-04-18 | End: 2022-04-20

## 2022-04-18 RX ORDER — SODIUM CHLORIDE 9 MG/ML
3 INJECTION INTRAMUSCULAR; INTRAVENOUS; SUBCUTANEOUS EVERY 8 HOURS
Refills: 0 | Status: DISCONTINUED | OUTPATIENT
Start: 2022-04-18 | End: 2022-04-20

## 2022-04-18 RX ORDER — ACETAMINOPHEN 500 MG
975 TABLET ORAL
Refills: 0 | Status: DISCONTINUED | OUTPATIENT
Start: 2022-04-18 | End: 2022-04-20

## 2022-04-18 RX ADMIN — Medication 975 MILLIGRAM(S): at 09:56

## 2022-04-18 RX ADMIN — SODIUM CHLORIDE 3 MILLILITER(S): 9 INJECTION INTRAMUSCULAR; INTRAVENOUS; SUBCUTANEOUS at 11:06

## 2022-04-18 RX ADMIN — Medication 1 TABLET(S): at 11:21

## 2022-04-18 RX ADMIN — Medication 600 MILLIGRAM(S): at 19:05

## 2022-04-18 RX ADMIN — Medication 1000 MILLIUNIT(S)/MIN: at 04:29

## 2022-04-18 RX ADMIN — SODIUM CHLORIDE 3 MILLILITER(S): 9 INJECTION INTRAMUSCULAR; INTRAVENOUS; SUBCUTANEOUS at 06:58

## 2022-04-18 RX ADMIN — Medication 975 MILLIGRAM(S): at 08:26

## 2022-04-18 RX ADMIN — Medication 600 MILLIGRAM(S): at 18:31

## 2022-04-18 NOTE — DISCHARGE NOTE OB - CARE PLAN
1 Principal Discharge DX:	Vaginal delivery  Assessment and plan of treatment:	Make your follow-up appointment with your doctor as ordered.   No heavy lifting, driving, or strenuous activity for 6 weeks.  Nothing per vagina such as tampons, intercourse, douches or tub baths for 6 weeks or until you see your doctor.  Call your doctor if you're unable to tolerate food, increase in vaginal bleeding, or have difficulty urinating. Call your doctor if you have pain that is not relieved by your prescribed medications. Notify your doctor with any other concerns.

## 2022-04-18 NOTE — LACTATION INITIAL EVALUATION - LACTATION INTERVENTIONS
initiate/review safe skin-to-skin/initiate/review hand expression/initiate/review techniques for position and latch/review techniques to increase milk supply/review techniques to manage sore nipples/engorgement/initiate/review breast massage/compression/reviewed components of an effective feeding and at least 8 effective feedings per day required/reviewed importance of monitoring infant diapers, the breastfeeding log, and minimum output each day/reviewed risks of unnecessary formula supplementation/reviewed risks of artificial nipples/reviewed benefits and recommendations for rooming in/reviewed feeding on demand/by cue at least 8 times a day/reviewed indications of inadequate milk transfer that would require supplementation

## 2022-04-18 NOTE — DISCHARGE NOTE OB - MEDICATION SUMMARY - MEDICATIONS TO TAKE
I will START or STAY ON the medications listed below when I get home from the hospital:    Acetaminophen Extra Strength Gelcaps 500 mg  -- 2 tab(s) by mouth every 6 hours  -- Indication: For pain    ibuprofen 600 mg oral tablet  -- 1 tab(s) by mouth every 6 hours  -- Indication: For pain    PNV Prenatal oral tablet  -- 1 tab(s) by mouth once a day  -- Indication: For vitamin

## 2022-04-18 NOTE — OB PROVIDER LABOR PROGRESS NOTE - NS_SUBJECTIVE/OBJECTIVE_OBGYN_ALL_OB_FT
PGY1 Labor Note    Patient seen and evaluated at bedside for rectal pressure     T(C): 36.5 (04-18-22 @ 01:57), Max: 36.8 (04-17-22 @ 09:42)  HR: 72 (04-18-22 @ 01:59) (51 - 86)  BP: 135/82 (04-18-22 @ 01:53) (96/51 - 135/82)  RR: 18 (04-17-22 @ 10:53) (18 - 18)  SpO2: 94% (04-18-22 @ 01:59) (85% - 100%)
PGY1 Labor Note    Patient seen and evaluated at bedside.      T(C): 36.3 (04-17-22 @ 18:06), Max: 36.8 (04-17-22 @ 09:42)  HR: 68 (04-17-22 @ 21:14) (55 - 86)  BP: 101/57 (04-17-22 @ 21:07) (96/51 - 132/81)  RR: 18 (04-17-22 @ 10:53) (18 - 18)  SpO2: 98% (04-17-22 @ 21:14) (85% - 100%)
Late entry 2/2 clinical care    Patient seen to discuss plan of care  Patient and partner expressed concerns regarding pitocin, risk of tachysystole  Agreed with both patient and partner that primary goal is vaginal delivery while maintaining the safety of both mom and baby  Reviewed FHT at bedside, impression of reassuring fetal status with cat 1 FHT  SVE 4/90/-2, grossly ruptured  patient counseled that in the setting of rupture and prolonged induction, would not recommend expectant management out of concern for infection  Labor curve reviewed--patient was concerned that pitocin had stalled her labor because she did not make change after starting it. Reviewed change in the past hour with adequate contractions and increased pitocin.   Patient and partner agree with plan for continuing induction with pitocin.   We agreed on a lower threshold for pausing augmentation if decelerations are noticed; we agreed to reassess need for augmentation at a short interval
Pt evaluated for progress.   Pt requesting IV pain meds, declines epidural at this time.
Pt examined at bedside for increased pain, requesting relief at this time    Vital Signs Last 24 Hrs  T(C): 36.8 (17 Apr 2022 09:42), Max: 36.8 (17 Apr 2022 09:42)  T(F): 98.24 (17 Apr 2022 09:42), Max: 98.24 (17 Apr 2022 09:42)  HR: 58 (17 Apr 2022 09:42) (55 - 71)  BP: 122/72 (17 Apr 2022 09:42) (117/71 - 127/76)  BP(mean): --  RR: 18 (17 Apr 2022 07:39) (18 - 18)  SpO2: --

## 2022-04-18 NOTE — OB PROVIDER DELIVERY SUMMARY - NSPROVIDERDELIVERYNOTE_OBGYN_ALL_OB_FT
Spontaneous vaginal delivery of liveborn female infant from OA position. Head, shoulders, and body delivered easily. Infant was suctioned. No mec. Delayed cord clamping and infant was passed to mother. Cord clamped and cut. Placenta delivered intact. Uterine sweep performed, no retained products. Fundal massage was given and uterine fundus was found to be firm. Vaginal exam revealed an intact cervix, vaginal walls and sulci. Patient had bilateral labial lacerations. R. labial laceration was hemostatic, L had slow bleeding. Patient requested not to be repaired and the use of vaginal packing if necessary. Pressure was held on laceration for 5 minutes and hemostasis was achieved. Patient was stable and went to recovery. Count was correct x 2.    Celi Angelo PGY1 Spontaneous vaginal delivery of liveborn female infant from OA position. Head, shoulders, and body delivered easily. Infant was suctioned. No mec. Delayed cord clamping and infant was passed to mother. Cord clamped and cut. Placenta delivered intact. Uterine sweep performed, no retained products. Fundal massage was given and uterine fundus was found to be firm. Vaginal exam revealed an intact cervix, vaginal walls and sulci. Patient had bilateral labial lacerations. R labial laceration was hemostatic, L had slow bleeding. Patient requested not to be repaired and the use of vaginal packing if necessary. Pressure was held on laceration for 5 minutes and hemostasis was achieved. L labial laceration was noted to be full thickness from mucosa to external skin over 1x1cm area; Patient counseled that defect may not close without a repair, and she may end up with a hole in her external labia without reapproximation. Patient stated that she "didn't want to be sewed up," and understands the risk of incomplete reapproximation with healing by secondary intention. Patient was stable and went to recovery. Count was correct x 2.    Celi Stephens PGY1 Spontaneous vaginal delivery of liveborn female infant from OA position. Head, shoulders, and body delivered easily. Infant was suctioned. No mec. Delayed cord clamping and infant was passed to mother. Cord clamped and cut. Placenta delivered intact. Uterine sweep performed, no retained products. Fundal massage was given and uterine fundus was found to be firm. Vaginal exam revealed an intact cervix, vaginal walls and sulci. Patient had bilateral labial lacerations. R labial laceration was hemostatic, L had slow bleeding. Patient requested not to be repaired and the use of vaginal packing if necessary. Pressure was held on laceration for 5 minutes and hemostasis was achieved. L labial laceration was noted to be full thickness from mucosa to external skin over 1x1cm area; Patient counseled that defect may not close without a repair, and she may end up with a hole in her external labia without reapproximation. Patient stated that she "didn't want to be sewed up," and understands the risk of incomplete reapproximation with healing by secondary intention. Patient was stable and went to recovery. Count was correct x 2.    Celi Angelo PGY1    OBGYN Service Attending  Agree with above.  Uncomplicated .  Pt declined repair of hemostatic labial lacerations.  MD Shiela

## 2022-04-18 NOTE — OB PROVIDER LABOR PROGRESS NOTE - NS_OBIHIFHRDETAILS_OBGYN_ALL_OB_FT
120/mod variability/+accels/-decels
Baseline: 120  Star: Mod  Accels: -   Decels: -
cat I
130, mod, +accel, -decel
Baseline: 150   Star: Mod  Accels: +  Decels: -

## 2022-04-18 NOTE — OB PROVIDER DELIVERY SUMMARY - NSSELHIDDEN_OBGYN_ALL_OB_FT
[NS_DeliveryAttending1_OBGYN_ALL_OB_FT:[id number]],[NS_DeliveryAssist1_OBGYN_ALL_OB_FT:[id number]],[NS_DeliveryAssist2_OBGYN_ALL_OB_FT:[id number]],[NS_DeliveryRN_OBGYN_ALL_OB_FT:[id number]=]

## 2022-04-18 NOTE — OB RN DELIVERY SUMMARY - NS_SEPSISRSKCALC_OBGYN_ALL_OB_FT
EOS calculated successfully. EOS Risk Factor: 0.5/1000 live births ([facility] national incidence); GA=39w3d; Temp=98.24; ROM=2.65; GBS='Negative'; Antibiotics='No antibiotics or any antibiotics < 2 hrs prior to birth'

## 2022-04-18 NOTE — OB PROVIDER LABOR PROGRESS NOTE - ASSESSMENT
28yo  IOL oligo making cervical change      - recheck cervix in 2 hours  - patient deciding if she would like an epidural at this time or hold off  - continuous monitoring  - routine labor care    d/w Dr. Huseyin Mane PGY2
A/P: 27y P0 admitted for oligo IUGR  - c/w pitocin   - epi for pain   - continuous monitoring/toco   - maternal repositioning/fluids prn for resuscitation     Celi Stephens PGY1
27 P0 at 39+2 presented in early labor, admitted for IOL for oligo  - stadol for pain  - pitocin for augmentation   - fetus cat I, continuos EFM     E Demirel PGY4  d/w Dr. La   
27y  at 39w3d IOL oligo  - labor: c/w pitocin  - fetus: cat 1, cont toco/efm  - gbs: neg, no indication for ppx  - IUGR?: overall EFW low, but scans not available at this time. Patient reports that she was told "baby is small" but her opinion was that this was due to her own small stature as well as a restricted dietary intake. She does not recall if delivery was recommended due to baby's small size.    d/w Dr. Anaid Ty PGY4
A/P: 27y P0 admitted for IOL 2/2 oligohydramnios   pt fully dilated and +1 station   will set up delivery table and start pushing  anticipate     Celi Stephnes PGY1

## 2022-04-18 NOTE — DISCHARGE NOTE OB - HOSPITAL COURSE
Patient had an uncomplicated . The patient had bilateral labial lacerations, which the patient declined to have repaired with sutures. The patient had an uncomplicated post partum course.     EBL: 169  Hct: 35.9    On Postpartum day 1, patient was discharged home in stable condition, voiding spontaneously and with normal vital signs. Patient had an uncomplicated . The patient had bilateral labial lacerations, which the patient declined to have repaired with sutures. Post-partum course was uncomplicated. Patient had stayed to PPD#2 after her  was transferred to the NICU after a fall.     EBL: 169  Hct: 35.9    On Postpartum day 2, patient was discharged home in stable condition, voiding spontaneously, and with normal vital signs.

## 2022-04-18 NOTE — DISCHARGE NOTE OB - PATIENT PORTAL LINK FT
You can access the FollowMyHealth Patient Portal offered by [facility] by registering at the following website: [email or web address]. By joining [employer]’s FollowMyHealth portal, you will also be able to view your health information using other applications (apps) compatible with our system.

## 2022-04-18 NOTE — DISCHARGE NOTE OB - MEDICATION SUMMARY - MEDICATIONS TO STOP TAKING
I will STOP taking the medications listed below when I get home from the hospital:  None I will STOP taking the medications listed below when I get home from the hospital:    terconazole 0.4% vaginal cream  -- 1 applicatorful vaginally once a day (at bedtime)

## 2022-04-18 NOTE — DISCHARGE NOTE OB - NS MD DC FALL RISK RISK
For information on Fall & Injury Prevention, visit: [email or web address] OR  [email or web address] OR  https://www.cdc.gov/steadi/patient.html

## 2022-04-18 NOTE — OB RN DELIVERY SUMMARY - NSSELHIDDEN_OBGYN_ALL_OB_FT
[NS_DeliveryAttending1_OBGYN_ALL_OB_FT:[id number]],[NS_DeliveryAssist1_OBGYN_ALL_OB_FT:[id number]],[NS_DeliveryAssist2_OBGYN_ALL_OB_FT:[id number]=],[NS_DeliveryRN_OBGYN_ALL_OB_FT:[id number]=]

## 2022-04-18 NOTE — LACTATION INITIAL EVALUATION - INTERVENTION OUTCOME
Chief Complaint   Patient presents with   • Diabetes     3 month follow up   • MEDICATION ISSUE     having biopsy  Aleshia 10/1/18 discuss blood thinner medication       SUBJECTIVE:  Bakari Harkins is a 64 year old male presenting for a three month follow up visit and medication review.  He would like to discuss an upcoming biopsy scheduled for 10/1/18 and holding Plavix for five days prior to procedure.    He states he hasn't been checking his blood sugar.  He doesn't know the last time it was checked.  He states he has not had any shakiness or sweating or signs of hypoglycemia.  He feels that his blood pressure has been low at times but he is feeling fairly well today. He does occasionally still have some lightheadedness and dizziness but reports it is nothing like it was before. His appetite is up and down.  He feels that it depends on what he is eating.  He states he is still smoking, but he had been doing good not smoking until his mom had a stroke and \"then it went backwards\".  He states she is doing better and is a [place] [facility] now.  He states he was informed of the results of his PET scan and knows that they are planning additional procedures for him.. He reports he will see the lung doctor Wednesday and [facility] next week.    Bakari denies chest pain, chest pressure, or chest tightness. Denies palpitations. Denies shortness of breath. Denies orthostasis or dizziness. he reports no side effects from his current medication regimen.    He states his legs and feet have been doing well, and he feels that the Fentanyl patches keep him out of a wheelchair.  He feels that he isn't ready for that and his wife Zee isn't either.  She is taking care of her dad, now his mom, and now his health problems. He reports it has been quite a bit of stress for them both.    PAST MEDICAL HISTORY:  Patient Active Problem List    Diagnosis Date Noted   • Nonadherence to medical treatment 06/18/2018     Priority: Low    • Shortness of breath/chest discomfort, diffuse 06/17/2018     Priority: Low   • Other chest pain 11/24/2017     Priority: Low   • Cerebral microvascular disease 08/31/2017     Priority: Low     CT head 8/29/2017  IMPRESSION:    1.  No acute intracranial findings. No evidence of enhancing lesion to  suggest metastatic disease by CT.  2.  Old right basal ganglia and left frontal lobe infarcts.  3.  Age-related cerebral atrophy and sequela of mild chronic microvascular  ischemic changes.     • Cerebral atrophy 08/31/2017     Priority: Low     CT head 8/29/2017  IMPRESSION:    3.  Age-related cerebral atrophy and sequela of mild chronic microvascular  ischemic changes.       • History of CVA (cerebrovascular accident), asymptomatic 08/31/2017     Priority: Low     CT head 8/29/2017  IMPRESSION:    1.  No acute intracranial findings. No evidence of enhancing lesion to  suggest metastatic disease by CT.  2.  Old right basal ganglia and left frontal lobe infarcts.  3.  Age-related cerebral atrophy and sequela of mild chronic microvascular  ischemic changes.       • Non-small cell carcinoma of left lung (CMS/HCC) 08/03/2017     Priority: Low     CT 8/23/2018  IMPRESSION:     Overall findings for disease progression.     1.  Persistent hypermetabolic left upper lobe squamous cell carcinoma,  concerning for residual/recurrent disease. Hypermetabolic left  paratracheal, aortopulmonary, and subcarinal lymph nodes, highly suspicious  for palomo metastatic disease.  2.  Enlarging, hypermetabolic 1.4 cm spiculated nodule within the superior  segment of the left upper lobe is concerning for malignancy, either  metastasis or synchronous primary pulmonary malignancy. More superiorly  located 4 mm pulmonary nodule is not hypermetabolic, though is below  resolution for PET CT and remains suspicious for malignancy. Stable  nonspecific lingular nodule as above.  3.  No evidence for metastatic disease outside the chest.    CT chest  3/22/2018  Impression:  1.  The left lower lobe nodule compatible with the treated lung cancer  appears relatively stable in size.  2.  No mediastinal lymphadenopathy, resolved compared to the prior CT scan.  3.  The bilateral airspace opacities seen on the prior CT scan have  resolved.     8/1/2017  Pathologic Diagnosis :     Lung, left lower lobe, transbronchial biopsy:     - Moderately-differentiated invasive squamous cell carcinoma.       CT 7/25/2017   IMPRESSION:     1.  Stable appearance of 3.8 cm left lower lobe mass, highly suspicious for  primary lung carcinoma.  2.  4 mm nodule in the left upper lobe is unchanged.  3.  Mildly enlarged mediastinal lymph nodes which are mildly hypermetabolic  on PET CT, unchanged.  4.  Severe coronary and abdominal aortic atherosclerosis.  5.  Bilateral homogenous enlargement of the adrenal glands.    PET 7/11/2017  IMPRESSION:      1.  Left lower lobe lung mass is intensely hypermetabolic, suspicious for  malignancy.  No convincing evidence for regional metastatic disease.  The  patient has chronically enlarged mediastinal and hilar lymph nodes that  have low-grade uptake which is either similar or less intense than on the  PET/CT from 7/10/2012.     2.  Negative for distant metastatic disease.     3.  Mild pulmonary vascular congestion.    CT 7/6/2017  IMPRESSION:       No pulmonary embolus     Mass in the left lower lobe. This is suspicious for neoplasm. Recommend PET  study of the chest as an outpatient     Mediastinal and hilar lymphadenopathy as described     Prominent adrenal glands     Ground glass infiltrates in both lower lobes     Cardiomegaly     Small subsolid nodule in the lingula         • Diastolic dysfunction 07/16/2017     Priority: Low     ECHO 7/7/2017  Left ventricular ejection fraction, 10-15 %.  Severe global left ventricular hypokinesis.  Grade III/IV diastolic dysfunction (restrictive filling pattern)  Moderately decreased right ventricular systolic  function.  Severe pulmonary hypertension, RVSP 72 mmHg.  TAPSE 13.8 mm. RV systolic tissue velocity = 8 cm/s .  Mild mitral valve regurgitation.  Aortic valve sclerosis without stenosis.  Compared to prior study from 09/26/16 RVSP has increased.     • Non-STEMI (non-ST elevated myocardial infarction) (CMS/HCC) 07/14/2017     Priority: Low     7/2017     • LBBB (left bundle branch block) 07/12/2016     Priority: Low   • Chronic combined systolic (congestive) and diastolic (congestive) heart failure (CMS/HCC) 02/01/2016     Priority: Low   • Cardiac murmur 01/29/2016     Priority: Low   • History of multiple pulmonary nodules 01/28/2016     Priority: Low     CT 9/28/2012  IMPRESSION:       1.  No suspicious nodule is detected on today's exam. Left lung base nodules  described on the prior PET/CT have resolved. Mild mediastinal lymphadenopathy is  persistent.    2.  Prior remote left ventricular apical myocardial infarct.  3. Left kidney low-density focus is not fully characterized. It may be a benign  hemorrhagic cyst.     • Bilateral carotid artery disease (CMS/HCC) 01/28/2016     Priority: Low     US 7/21/2015  IMPRESSION:  Mild to moderate atherosclerotic plaque is noted involving bilateral  carotid arteries. Using NASCET criteria, there is 1-15% stenosis in  bilateral carotid arteries.     Bilateral antegrade vertebral artery flow.     • Atherosclerosis of native artery of both lower extremities with intermittent claudication (CMS/HCC) 11/09/2015     Priority: Low     KRISS 1/26/2016  Interpretation Data  The right brachial systolic pressure was 107 mmHg.  The right posterior    tibial systolic pressure was 47 mmHg.  The right dorsalis pedis systolic    pressure was unobtainable.  The right KRISS was 0.44. The ankle waveform was    severely abnormal on the right.  The left brachial systolic pressure was 108 mmHg.  The left posterior tibial    systolic pressure was 60 mmHg.  The left dorsalis pedis systolic pressure  was    66 mmHg.  The left KRISS was 0.61. The ankle waveform was severely abnormal on    the left.  The right digital arterial pressure of the 1st digit was 12 mmHg.  The right    1st toe brachial index was 0.11.           The left digital arterial pressure of the 1st digit was 27 mmHg.  The left    1st toe brachial index was 0.25.       CT 7/24/2015  SUMMARY:     1. Severe atherosclerotic disease of the infrarenal aorta.  2. Patent right common iliac to external iliac stent  3. Patent left common iliac stent  4. Severe stenosis of the left common femoral artery.  5. Total bilateral SFA occlusions with collateral reconstitution of the  bilateral popliteal arteries.  6. Bilateral occlusions of the femoral to popliteal artery surgical  conduits.  7. Two-vessel runoff bilaterally.              • COPD (chronic obstructive pulmonary disease) (CMS/HCC) 06/23/2015     Priority: Low     PFT 8/8/2017  OVERALL IMPRESSION:  Preserved spirometric values with evidence of gas trapping and a moderate reduction in the diffusing capacity.  Clinical correlation is advised.     • RLS (restless legs syndrome) 06/22/2015     Priority: Low   • PAH (pulmonary artery hypertension) (CMS/HCC) 06/22/2015     Priority: Low     ECHO  F/U Pulmonary Hypertension  Pulmonary hypertension, RVSP 63.8 mmHg.  Increased right ventricular size.  Decreased right ventricular radial function, FAC 10.4%.  Decreased right ventricular longitudinal function, RV TDI S wave 8.2 cm/sec, TAPSE 1.4 cm.  Right ventricular global longitudinal strain reduced, -4.0%.  Severely decreased left ventricular systolic function. LVEF 15%  Agitated saline was injected through a peripheral vein and did not show evidence of a shunt.  Compared to prior study, RVSP has decreased from 82 to 62 mmHg.     • Diabetic polyneuropathy (CMS/HCC) 07/05/2013     Priority: Low   • Type 2 diabetes mellitus with diabetic neuropathy (CMS/HCC) 07/05/2013     Priority: Low   • Coronary  atherosclerosis of native coronary artery 04/24/2013     Priority: Low     Angioplasty and stent in 2007     • Dyslipidemia 04/24/2013     Priority: Low   • Continuous tobacco abuse 04/24/2013     Priority: Low   • Biventricular ICD (implantable cardioverter-defibrillator) [facility] 04/05/2013     Priority: Low     Has Merlin not hooked up no Land line       • Cardiomyopathy, ischemic      Priority: Low     ECHO 7/7/2017  Left ventricular ejection fraction, 10-15 %.  Severe global left ventricular hypokinesis.  Grade III/IV diastolic dysfunction (restrictive filling pattern)  Moderately decreased right ventricular systolic function.  Severe pulmonary hypertension, RVSP 72 mmHg.  TAPSE 13.8 mm. RV systolic tissue velocity = 8 cm/s .  Mild mitral valve regurgitation.  Aortic valve sclerosis without stenosis.  Compared to prior study from 09/26/16 RVSP has increased.    ECHO 9/26/2016  AV Optimization performed.  Severely decreased left ventricular systolic function. Can not assess wall motion ( contrast was not used )  Grade II/IV diastolic dysfunction  Right ventricular systolic pressure 54.6 mmHg.  Pacemaker catheter visualized.  Compared to prior study, no significant change was noted    ECHO 1/19/2016  Left ventricular ejection fraction, 15 %.  Moderately increased right ventricular size.  Moderately decreased right ventricular systolic function.  Right ventricular systolic pressure 78.3 mmHg.  Severely increased left atrial size.  No significant valve abnormalities.  Pacemaker catheter visualized.  Compared to prior study, PA has dereased from 82-78 mm Hg.    6/20/2015 ECHO  Left ventricular ejection fraction, 15 %.  Moderately increased right ventricular size.  Right ventricular systolic pressure 82.1 mmHg.  No significant valve abnormalities.  Prior study not available for comparison.  2014  ECHO  Dilated cardiomyopathy. LVEF:15%  As compared to the prior study from 7/18/2012, the LV systolic function has further  decreased with adverse remodeling and  increased enlargement of the ventricle.     • Peripheral arterial disease (CMS/HCC)      Priority: Low     Cath 10/12/2015  CONCLUSION:  1.  Bilateral patent kissing iliac stents.     2.  Occluded internal iliac arteries.  Diffuse small vessels.  3.  Severe disease involving the left common femoral artery.  4.  Bilateral femoropopliteal below-knee bypass graft occlusions.  5.  Reconstitution of the below-knee popliteal artery with dominance of the posterior tibial arteries bilaterally.    CT 7/24/2015  SUMMARY:  1.  Severe atherosclerotic disease of the infrarenal aorta.  2.  Patent right common iliac to external iliac stent  3.  Patent left common iliac stent  4.  Severe stenosis of the left common femoral artery.  5.  Total bilateral SFA occlusions with collateral reconstitution of the  bilateral popliteal arteries.  6.  Bilateral occlusions of the femoral to popliteal artery surgical  conduits.  7.  Two-vessel runoff bilaterally.         Past Surgical History:   Procedure Laterality Date   • Anes bronchoscopy; w/bx  4/22/2011    right lung mass neg for malignacy   • Angioplasty  8/9/2013    1. Right common and profunda femoral endarterectomy with patch angioplasty   • Appendectomy     • Bronchoscopy/needle bx add'l  5/20/2011    Flex bronch with fine needle aspiration   • Colonoscopy w/ polypectomy  5/21/2012   • Ep study  10/6/2008   • Ercp w/ sphicterotomy  7/6/2014   • Femoral bypass  4/19/2007    Left Saphenous Vein Fem-pop bypass   • Femoral bypass  4/15/2010    Redo left  bypass graft occulsion   • Icd implant  10/6/2008    [facility] Pratik Dual Chamber   • Iliac artery stent  5/15/2013    Bilateral leg angiogram; bilateral iliac stenting   • Laparoscopy,cholyst w/ cholgpy  7/4/2014   • Lower extremity angiograms/possible pta/possible stent  10/12/2015   • Mediastinoscopy  5/20/2011    with lymph node biopsy   • Ptca     • Ptca with stent  1/8/2007    LAD recannunalization of   2 kissing stents placed between 2 prior sents mini vision bare-metal   • Removal gallbladder     • Service to gastroenterology     • Squamous cell carcinoma excision  4/15/2010    Right groin   • Tonsillectomy and adenoidectomy     • Upper gastrointestinal endoscopy  5/21/2012   • Vascular surgery         Current Outpatient Medications   Medication Sig Dispense Refill   • metformin (GLUCOPHAGE) 1000 MG tablet Take 1 tablet by mouth daily (with breakfast). 90 tablet 1   • metoPROLOL succinate (TOPROL-XL) 25 MG 24 hr tablet Take 1 tablet by mouth daily. 90 tablet 1   • traMADol (ULTRAM) 50 MG tablet Take 1 tablet by mouth every 6 hours as needed for Pain. 30 tablet 0   • fentaNYL (DURAGESIC) 50 MCG/HR patch Place 1 patch onto the skin every 48 hours. 15 patch 0   • metOLazone (ZAROXOLYN) 2.5 MG tablet Take one (2.5 mg) tablet every Wednesday. 12 tablet 1   • nitroGLYcerin (NITROSTAT) 0.4 MG sublingual tablet Place 1 tablet under the tongue every 5 minutes as needed for Chest pain. 25 tablet 0   • clopidogrel (PLAVIX) 75 MG tablet Take 1 tablet by mouth daily. 90 tablet 1   • furosemide (LASIX) 80 MG tablet Take 1 tablet by mouth daily. 90 tablet 1   • fluticasone-salmeterol (ADVAIR DISKUS) 100-50 MCG/DOSE inhaler Inhale 1 puff into the lungs two times daily. Sample given     • potassium chloride 10 MEQ CR tablet Take 1 tablet by mouth daily. 20 mEq for 1st dose, then (Patient not taking: Reported on 9/24/2018) 31 tablet 5   • albuterol (VENTOLIN) (2.5 MG/3ML) 0.083% nebulizer solution Take 3 mLs by nebulization every 6 hours as needed for Wheezing. 375 mL 12   • aspirin 81 MG tablet Take 81 mg by mouth daily.       No current facility-administered medications for this visit.        ALLERGIES:   Allergen Reactions   • Contrast Media ANAPHYLAXIS   • Cymbalta [Duloxetine Hcl] VOMITING and NAUSEA   • Doxycycline VOMITING and NAUSEA       Family History   Problem Relation Age of Onset   • Cancer Father         \"all over\"    • Vascular Father    • ENT Disorder Brother         hearing loss   • Stroke Mother        SOCIAL HISTORY:  Social History     Tobacco Use   Smoking Status Current Every Day Smoker   • Packs/day: 2.00   • Years: 30.00   • Pack years: 60.00   • Types: Cigarettes   Smokeless Tobacco Never Used   Tobacco Comment    smoking approx 4-6 cigarettes per day       OBJECTIVE:  Visit Vitals  /72   Pulse 88   Resp 18   Ht 5' 7\" (1.702 m)   Wt 73 kg   BMI 25.22 kg/m²     General: Pleasant somewhat chronically ill-appearing white male. He is in no acute distress although he moves somewhat slowly from the chair to the exam table.  HEENT:  Eyes are moist. Sclerae are anicteric. Conjunctivae are neither pale nor injected. Oropharynx is moist without erythema, exudate, or petechiae.  Neck:  Supple. No JVD (jugular venous distention), bruit, or thyromegaly. Trachea is midline. No anterior cervical, posterior cervical, or supraclavicular adenopathy.  Lungs:  Clear to auscultation bilaterally without crackles or wheezes. There is normal effort. There is fair aeration.  Heart:  Regular rate and rhythm without murmurs, rubs, or gallops. Radial and posterior tibial pulses are palpable and symmetric bilaterally.  Extremities:  No cyanosis, clubbing, or edema. He has very dry skin on the feet. There is thickening and dystrophy of the nails. His toenail care is poor. Third toenail on the right foot does crawl underneath the toe little bit although it is not yet cutting into the skin. He has some calluses present.  Skin:  Warm and dry with normal turgor. No rash. Very dry feet.  Neurologic:  Alert and oriented ×3. Grossly nonfocal. Monofilament exam is slightly decreased in the right foot compared to the left.    ASSESSMENT AND PLAN:    Bakari was seen today for diabetes and medication issue.    Diagnoses and all orders for this visit:    Type 2 diabetes mellitus with diabetic neuropathy, without long-term current use of insulin  (CMS/HCC)  -     GLYCOHEMOGLOBIN; Future  -     MICROALBUMIN URINE RANDOM; Future  -     COMPREHENSIVE METABOLIC PANEL; Future  -     LIPID PANEL WITH REFLEX; Future  Bakari is not having any side effects from his current medication regimen. his most recent labs are reviewed. Work on lifestyle modification. Handout given. Further plan pending results of labs. Follow-up as directed. He reports he is not able to give a urine sample right now but will go ahead and do so when he is at the hospital next week.  Cardiomyopathy, ischemic  Stable.  Asymptomatic.  Continue risk factor reduction. Continue to follow with cardiology. Needs to stop smoking.  Atherosclerosis of native coronary artery of native heart without angina pectoris  Stable.  Asymptomatic.  Continue risk factor reduction. Needs to stop smoking.  Atherosclerosis of native artery of both lower extremities with intermittent claudication (CMS/HCC)  Doing relatively well. Continue current regimen.  Chronic combined systolic (congestive) and diastolic (congestive) heart failure (CMS/HCC)  He is at baseline. Continue current treatment regimen. His medication regimen has been limited by his hypotension.  Chronic obstructive pulmonary disease, unspecified COPD type (CMS/HCC)  Must stop smoking. Continue to follow with pulmonary.  Diabetic polyneuropathy associated with type 2 diabetes mellitus (CMS/HCC)  Watch his feet very closely. Strongly encouraged him to seek podiatry care. He says he will have his wife trim his nails. She has done a good job for him in the past. We discussed monitoring his feet very closely as he is at risk for complications related to his diabetes and peripheral neuropathy.  Non-small cell carcinoma of left lung (CMS/HCC)  Probable recurrence. Labs scheduled. Likely will need additional treatment.  Screening for prostate cancer  -     PROSTATE SPECIFIC ANTIGEN; Future  Further plan pending results.  Tobacco use disorder   Strongly encourage  complete cessation. Patient will let us know if he has any need of any further assistance.  Other orders  -     metformin (GLUCOPHAGE) 1000 MG tablet; Take 1 tablet by mouth daily (with breakfast).  -     metoPROLOL succinate (TOPROL-XL) 25 MG 24 hr tablet; Take 1 tablet by mouth daily.  -     traMADol (ULTRAM) 50 MG tablet; Take 1 tablet by mouth every 6 hours as needed for Pain.  -     fentaNYL (DURAGESIC) 50 MCG/HR patch; Place 1 patch onto the skin every 48 hours.    [place] PDMP website was reviewed today.  No abnormalities noted.    Instructed to return to clinic or call if symptoms are not resolved as discussed, or sooner if worse.  All questions answered and patient is agreeable to this plan.  Refer to after visit summary.    On 9/24/2018, Sudha VALLEJO LPN scribed the services personally performed by Gordy Norton MD    The documentation recorded by the scribe accurately and completely reflects the services I personally performed and the decisions made by me.    Gordy Norton M.D.   verbalizes understanding/demonstrates understanding of teaching/good return demonstration/needs met/Lactation team to follow up

## 2022-04-18 NOTE — DISCHARGE NOTE OB - CARE PROVIDER_API CALL
Yajaira Burgos)  Obstetrics and Gynecology  [address], [address]  [place] [address]  Phone: [phone]  Fax: [phone]  Follow Up Time:

## 2022-04-19 NOTE — PROGRESS NOTE ADULT - SUBJECTIVE AND OBJECTIVE BOX
OB Progress Note:  PPD#1    S: 26yo PPD#1 s/p  notable for hemostatic labial lacerations for which she declined re-approximation. Patient feels well. Pain is well controlled, tolerating regular diet, passing flatus, voiding spontaneously, and ambulating without difficulty. Denies significant VB, chest pain, shortness of breath, n/v, light-headedness/dizziness.       O:  Vitals:  Vital Signs Last 24 Hrs  T(C): 36.3 (2022 05:33), Max: 37.2 (2022 14:29)  T(F): 97.4 (2022 05:33), Max: 99 (2022 14:29)  HR: 79 (2022 05:33) (68 - 93)  BP: 125/70 (2022 05:33) (116/66 - 125/70)  BP(mean): --  RR: 17 (2022 05:33) (16 - 18)  SpO2: 99% (2022 05:33) (98% - 100%)    MEDICATIONS  (STANDING):  acetaminophen     Tablet .. 975 milliGRAM(s) Oral <User Schedule>  diphtheria/tetanus/pertussis (acellular) Vaccine (ADAcel) 0.5 milliLiter(s) IntraMuscular once  ibuprofen  Tablet. 600 milliGRAM(s) Oral every 6 hours  ketorolac   Injectable 30 milliGRAM(s) IV Push once  oxytocin Infusion 333.333 milliUNIT(s)/Min (1000 mL/Hr) IV Continuous <Continuous>  oxytocin Infusion 333.333 milliUNIT(s)/Min (1000 mL/Hr) IV Continuous <Continuous>  prenatal multivitamin 1 Tablet(s) Oral daily  sodium chloride 0.9% lock flush 3 milliLiter(s) IV Push every 8 hours      Labs:  Blood type: O Positive  Rubella IgG: RPR: Negative                          11.7   7.18 >-----------< 377    (  @ 10:36 )             35.9                  Physical Exam:  General: No acute distress  Respiratory: No respiratory distress. Unlabored breathing   Abdomen: Soft. Non-tender. Non-distended. Fundus is firm  Extremities: No calf tenderness bilaterally

## 2022-04-19 NOTE — CHART NOTE - NSCHARTNOTEFT_GEN_A_CORE
[id number]    House officer made aware that the patient was transferred to the NICU. Patient desires to stay overnight. D/c order canceled     Luther Nicholson  PGY-1, Obstetrics & Gynecology     d/w Dr. Marks 1548    House officer made aware that the patient's  was transferred to the NICU. Patient desires to stay overnight. D/c order canceled     Luther Nicholson  PGY-1, Obstetrics & Gynecology     d/w Dr. Marks

## 2022-04-20 VITALS
RESPIRATION RATE: 17 BRPM | SYSTOLIC BLOOD PRESSURE: 123 MMHG | DIASTOLIC BLOOD PRESSURE: 63 MMHG | TEMPERATURE: 99 F | OXYGEN SATURATION: 100 % | HEART RATE: 95 BPM

## 2022-04-20 DIAGNOSIS — O41.00X0 OLIGOHYDRAMNIOS, UNSPECIFIED TRIMESTER, NOT APPLICABLE OR UNSPECIFIED: ICD-10-CM

## 2022-04-20 NOTE — PROGRESS NOTE ADULT - ASSESSMENT
A/P: 26yo PPD#1 s/p  notable for hemostatic labial lacerations for which she declined re-approximation Patient is stable and doing well post-partum.   - Pain well controlled, continue current pain regimen  - Increase ambulation  - Continue regular diet    #Post-partum care  - Patient amendable for discharge today if able     Luther Nicohlson, PGY-1  Ob/Gyn
POD #1 s/p vaginal delivery. Patient is doing well, ambulating out of bed, urinating, tolerating diet. Denies nausea/vomiting. Pain is tolerable. No residual anesthetic issues or complications noted.    Sania Davis CRNA  
A/P: 26yo PPD#2 s/p .  Patient is stable and doing well post-partum.    - Pain well controlled, continue current pain regimen  - Increase ambulation  - Continue regular diet  - Discharge planning. Pt is for discharge today     Luther Nicholson, PGY-1  Ob/Gyn

## 2022-04-20 NOTE — PROGRESS NOTE ADULT - SUBJECTIVE AND OBJECTIVE BOX
OB Progress Note:  PPD#2    S: 27y PPD#2 s/p . Patient had stayed overnight after baby was transferred to the NICU s/p fall. Patient feels well. Pain is well controlled, tolerating regular diet, passing flatus, voiding spontaneously, and ambulating without difficulty. Denies significant vaginal bleeding, chest pain, shortness of breath, light-headedness/ dizziness, or n/v.   O:  Vitals:   Vital Signs Last 24 Hrs  T(C): 37.1 (2022 07:38), Max: 37.1 (2022 17:15)  T(F): 98.7 (2022 07:38), Max: 98.7 (2022 17:15)  HR: 74 (2022 07:38) (74 - 91)  BP: 113/58 (2022 07:38) (113/58 - 124/68)  BP(mean): --  RR: 19 (2022 07:38) (17 - 19)  SpO2: 99% (2022 07:38) (99% - 99%)    MEDICATIONS  (STANDING):  acetaminophen     Tablet .. 975 milliGRAM(s) Oral <User Schedule>  diphtheria/tetanus/pertussis (acellular) Vaccine (ADAcel) 0.5 milliLiter(s) IntraMuscular once  ibuprofen  Tablet. 600 milliGRAM(s) Oral every 6 hours  ketorolac   Injectable 30 milliGRAM(s) IV Push once  oxytocin Infusion 333.333 milliUNIT(s)/Min (1000 mL/Hr) IV Continuous <Continuous>  oxytocin Infusion 333.333 milliUNIT(s)/Min (1000 mL/Hr) IV Continuous <Continuous>  prenatal multivitamin 1 Tablet(s) Oral daily  sodium chloride 0.9% lock flush 3 milliLiter(s) IV Push every 8 hours    MEDICATIONS  (PRN):  benzocaine 20%/menthol 0.5% Spray 1 Spray(s) Topical every 6 hours PRN for Perineal discomfort  dibucaine 1% Ointment 1 Application(s) Topical every 6 hours PRN Perineal discomfort  diphenhydrAMINE 25 milliGRAM(s) Oral every 6 hours PRN Pruritus  hydrocortisone 1% Cream 1 Application(s) Topical every 6 hours PRN Moderate Pain (4-6)  lanolin Ointment 1 Application(s) Topical every 6 hours PRN nipple soreness  magnesium hydroxide Suspension 30 milliLiter(s) Oral two times a day PRN Constipation  oxyCODONE    IR 5 milliGRAM(s) Oral every 3 hours PRN Moderate to Severe Pain (4-10)  oxyCODONE    IR 5 milliGRAM(s) Oral once PRN Moderate to Severe Pain (4-10)  pramoxine 1%/zinc 5% Cream 1 Application(s) Topical every 4 hours PRN Moderate Pain (4-6)  simethicone 80 milliGRAM(s) Chew every 4 hours PRN Gas  witch hazel Pads 1 Application(s) Topical every 4 hours PRN Perineal discomfort      Labs:  Blood type: O Positive  Rubella IgG: RPR: Negative                          11.7   7.18 >-----------< 377    (  @ 10:36 )             35.9                  Physical Exam:  General: No acute distress  Respiratory: No respiratory distress. Unlabored breathing   Abdomen: Soft. Non-tender. Non-distended. Fundus is firm   Extremities: No pitting edema or calf tenderness bilaterally       OB Progress Note:  PPD#2    S: 27y PPD#2 s/p . Patient had stayed overnight after baby was transferred to the NICU s/p fall. Patient feels well. Pain is well controlled, tolerating regular diet, passing flatus, voiding spontaneously, and ambulating without difficulty. Denies significant vaginal bleeding, chest pain, shortness of breath, light-headedness/ dizziness, or n/v.     O:  Vitals:   Vital Signs Last 24 Hrs  T(C): 37.1 (2022 07:38), Max: 37.1 (2022 17:15)  T(F): 98.7 (2022 07:38), Max: 98.7 (2022 17:15)  HR: 74 (2022 07:38) (74 - 91)  BP: 113/58 (2022 07:38) (113/58 - 124/68)  BP(mean): --  RR: 19 (2022 07:38) (17 - 19)  SpO2: 99% (2022 07:38) (99% - 99%)    MEDICATIONS  (STANDING):  acetaminophen     Tablet .. 975 milliGRAM(s) Oral <User Schedule>  diphtheria/tetanus/pertussis (acellular) Vaccine (ADAcel) 0.5 milliLiter(s) IntraMuscular once  ibuprofen  Tablet. 600 milliGRAM(s) Oral every 6 hours  ketorolac   Injectable 30 milliGRAM(s) IV Push once  oxytocin Infusion 333.333 milliUNIT(s)/Min (1000 mL/Hr) IV Continuous <Continuous>  oxytocin Infusion 333.333 milliUNIT(s)/Min (1000 mL/Hr) IV Continuous <Continuous>  prenatal multivitamin 1 Tablet(s) Oral daily  sodium chloride 0.9% lock flush 3 milliLiter(s) IV Push every 8 hours    MEDICATIONS  (PRN):  benzocaine 20%/menthol 0.5% Spray 1 Spray(s) Topical every 6 hours PRN for Perineal discomfort  dibucaine 1% Ointment 1 Application(s) Topical every 6 hours PRN Perineal discomfort  diphenhydrAMINE 25 milliGRAM(s) Oral every 6 hours PRN Pruritus  hydrocortisone 1% Cream 1 Application(s) Topical every 6 hours PRN Moderate Pain (4-6)  lanolin Ointment 1 Application(s) Topical every 6 hours PRN nipple soreness  magnesium hydroxide Suspension 30 milliLiter(s) Oral two times a day PRN Constipation  oxyCODONE    IR 5 milliGRAM(s) Oral every 3 hours PRN Moderate to Severe Pain (4-10)  oxyCODONE    IR 5 milliGRAM(s) Oral once PRN Moderate to Severe Pain (4-10)  pramoxine 1%/zinc 5% Cream 1 Application(s) Topical every 4 hours PRN Moderate Pain (4-6)  simethicone 80 milliGRAM(s) Chew every 4 hours PRN Gas  witch hazel Pads 1 Application(s) Topical every 4 hours PRN Perineal discomfort      Labs:  Blood type: O Positive  Rubella IgG: RPR: Negative                          11.7   7.18 >-----------< 377    (  @ 10:36 )             35.9            Physical Exam:  General: No acute distress  Respiratory: No respiratory distress. Unlabored breathing   Abdomen: Soft. Non-tender. Non-distended. Fundus is firm   Extremities: No pitting edema or calf tenderness bilaterally

## 2024-07-18 NOTE — OB RN PATIENT PROFILE - FUNCTIONAL ASSESSMENT - DAILY ACTIVITY SCORE HIDDEN
OFFICE VISIT      Patient: Paramjit Steen Date of Service: 2024   : 1971 MRN: [id number]     SUBJECTIVE:   HISTORY OF PRESENT ILLNESS:  Paramjit Steen is a 52 year old female who presents today for   Chief Complaint   Patient presents with    Annual Exam     PCP is in [place]-  Physical   Bilateral Feet swelling with walking   Discuss weight gain fluctuations   Digestive concerns   Referral for nephrology   .  Hx of kidney transplant  History of renal transplant in  at [facility].  Follows with nephrology yearly.  Patient is scheduled to have labs drawn every 3 months.  Has not had labs drawn.  Requesting referral to nephrology    Obesity  Has gained about 40 pounds in one year. Exercises 3x per week. Walks and does aerobics. 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  Recently joined [employer]. Weight watchers has worked in the past. Is trying to cut down on snacks and sweets. Does not eat a lot of fast food. Has talked with nutritionist in the past. States she knows what she has to do to lose weight but has a hard time sticking to plan. Feels like she is addicted to food.     Bilateral ankle swelling + SOB  Reports increased edema to bilateral ankles over the last couple of months. Reports SOB while walking up the stairs.  Does not monitor his sodium intake.  Elevates legs and swelling resolves.        PAST MEDICAL HISTORY:  Patient Active Problem List   Diagnosis    Colon polyp    Chondromalacia, knee    Hyperlipidemia    Hypertension    Low back pain    Obesity    Osteoarthritis of knee    Primary IgA nephropathy    History of kidney transplant (CMD)    Spondylosis of cervical region without myelopathy or radiculopathy    Perimenopause    Obesity (BMI 30-39.9)    Stage 2 chronic kidney disease    Annual physical exam    Need for vaccination    Primary insomnia    Neck pain    Chronic pain of both knees    Rectal bleeding    Impacted cerumen of left ear    Vaginal dryness     Muscle spasms of neck    Lab test positive for detection of COVID-19 virus        MEDICATIONS:  Current Outpatient Medications   Medication Sig    furosemide (LASIX) 40 MG tablet Take 1 tablet by mouth daily as needed.    fluticasone (FLONASE) 50 MCG/ACT nasal spray Spray 1 spray in each nostril daily for 7 days. (Patient not taking: Reported on 7/18/2024)    bisacodyl (DULCOLAX) 5 MG EC tablet Take 1 tablet by mouth 1 time. Take 2 tablets at 7pm following first half of colonoscopy PREP the evening before the colonoscopy. (Patient not taking: Reported on 7/18/2024)    electrolyte/PEG 3350 (Golytely) 236 g solution Drink first half of gallon from 5pm-7pm the evening before your scheduled colonoscopy. Complete second half 4-5 hours before colonoscopy (Patient not taking: Reported on 7/18/2024)    cyclobenzaprine (FLEXERIL) 5 MG tablet TAKE ONE TABLET BY MOUTH AT BEDTIME AS NEEDED FOR MUSCLE SPASMS    polyethylene glycol (MiraLax) 17 g packet Take 17 g by mouth daily. Stir and dissolve powder in any 4 to 8 ounces of beverage, then drink.    famotidine (Pepcid) 40 MG tablet Take 0.5 tablets by mouth 2 times daily as needed (Heartburn). (Patient not taking: Reported on 7/18/2024)    lisinopril (ZESTRIL) 10 MG tablet Take 1 tablet by mouth daily.    NIFEdipine XL (PROCARDIA XL) 90 MG 24 hr tablet Take 1 tablet by mouth daily.    estrogens, conjugated (PREMARIN) vaginal cream Place 0.5 g vaginally daily for 14 days, THEN 0.5 g 2 days a week.    LORazepam (ATIVAN) 0.5 MG tablet Take 0.5 mg by mouth. (Patient not taking: Reported on 7/18/2024)    furosemide (LASIX) 40 MG tablet Take 1 tablet by mouth.    TACROlimus (PROGRAF) 5 MG capsule Take 5 mg by mouth 2 times daily.    mycophenolate (CELLCEPT) 250 MG capsule Take by mouth 2 times daily.    simvastatin (ZOCOR) 10 MG tablet Take 10 mg by mouth nightly.     No current facility-administered medications for this visit.       ALLERGIES:  ALLERGIES:  No Known Allergies    PAST  SURGICAL HISTORY:  Past Surgical History:   Procedure Laterality Date    Hernia repair  2001    Ir av graft/fistula (fistulagram w/ intervention) Left 2000    never worked    Kidney transplant  2002    Ovarian cyst removal      Peritoneal dialysis  2001    Port indwelling implantable  2000    Renal biopsy         FAMILY HISTORY:  Family History   Problem Relation Age of Onset    Cancer, Breast Maternal Aunt     Other Mother         hysterectomy    Patient is unaware of any medical problems Father     Cancer Maternal Cousin     Cancer, Colon Neg Hx        SOCIAL HISTORY:  Social History     Tobacco Use    Smoking status: Never    Smokeless tobacco: Never   Vaping Use    Vaping status: Former    Substances: THC, Flavoring    Devices: Disposable, Pre-filled pod   Substance Use Topics    Alcohol use: Yes     Comment: 1-2x a month    Drug use: Yes     Types: Marijuana       SCREENINGS:   No results found.    Recent PHQ 2/9 Score    PHQ 2:  PHQ 2 Score Adult PHQ 2 Score Adult PHQ 2 Interpretation Little interest or pleasure in activity?   7/18/2024   5:17 PM 0 No further screening needed 0       PHQ 9:     Most Recent GERARDO 7 Score            Review of Systems   Constitutional:  Negative for chills, fatigue and fever.   Eyes:  Negative for visual disturbance.   Respiratory:  Positive for shortness of breath. Negative for cough and wheezing.    Cardiovascular:  Positive for leg swelling. Negative for chest pain.   Gastrointestinal:  Negative for abdominal pain, constipation, diarrhea, nausea and vomiting.   Endocrine: Negative for cold intolerance and heat intolerance.   Musculoskeletal:  Negative for back pain.   Skin:  Negative for rash.   Neurological:  Negative for dizziness, weakness and headaches.   Hematological:  Does not bruise/bleed easily.   Psychiatric/Behavioral:  Negative for agitation and confusion.          OBJECTIVE:     Visit Vitals  /82 (BP Location: LUE - Left upper extremity, Patient Position:  Sitting, Cuff Size: Large Adult)   Pulse 72   Temp 97.3 °F (36.3 °C) (Tympanic)   Resp 16   Ht 5' 5\" (1.651 m)   Wt 98.5 kg (217 lb 2.5 oz)   LMP  (LMP Unknown)   SpO2 96%   BMI 36.14 kg/m²       Physical Exam  Constitutional:       Appearance: Normal appearance.   HENT:      Head: Normocephalic.      Right Ear: Tympanic membrane normal.      Left Ear: Tympanic membrane normal.      Nose: Nose normal.      Mouth/Throat:      Mouth: Mucous membranes are moist.      Neck: Normal range of motion.   Eyes:      Conjunctiva/sclera: Conjunctivae normal.   Cardiovascular:      Rate and Rhythm: Normal rate and regular rhythm.      Pulses: Normal pulses.      Heart sounds: Normal heart sounds.   Pulmonary:      Effort: Pulmonary effort is normal.      Breath sounds: Normal breath sounds.   Abdominal:      General: Bowel sounds are normal.      Palpations: Abdomen is soft.   Musculoskeletal:         General: Normal range of motion.      Right lower le+ Edema present.      Left lower le+ Edema present.   Skin:     General: Skin is warm and dry.   Neurological:      Mental Status: She is alert and oriented to person, place, and time.   Psychiatric:         Mood and Affect: Mood normal.            ASSESSMENT AND PLAN:   This is a 52 year old year-old female who presents with   Chief Complaint   Patient presents with    Annual Exam     PCP is in [place]-  Physical   Bilateral Feet swelling with walking   Discuss weight gain fluctuations   Digestive concerns   Referral for nephrology   .      History of kidney transplant (CMD)  -Follows with Dr. Correa, nephrology  -Referral to nephrology provided  - SERVICE TO NEPHROLOGY    Obesity (BMI 30-39.9)  -Discussed 30 minutes of daily exercise  -Recommend eating a well-balanced low-carb diet  - SERVICE TO MEDICAL WEIGHT MANAGEMENT  - SERVICE TO BEHAVIORAL HEALTH    Bilateral swelling of feet and ankles  -Monitor sodium intake  -Wear compression stockings  -Elevate legs above the  level of the heart  -f/u with PCP if symptoms persist    SOB (shortness of breath)  -cxr ordered  - XR CHEST PA AND LATERAL 2 VIEWS; Future    Encounter for screening mammogram for malignant neoplasm of breast  - MAMMO SCREENING BILATERAL W MINDY; Future         Instructions provided as documented in the AVS.    The patient indicated understanding of the diagnosis and agreed with the plan of care.      Marleny Conroy, CNP   24

## 2025-05-19 NOTE — LACTATION INITIAL EVALUATION - DEPRESSION
Discharge Facility:  [facility]  Discharge Diagnosis: A-fib RVR  Admission Date:  5/15/25  Discharge Date: 5/16/25    PCP Appointment Date:  TBD  Specialist Appointment Date:   MAY 20  2025 10:50 AM - Cardiology Hospital Discharge  [facility] - Yoselin Umana MD     Hospital Encounter and Summary Linked: Yes  Discharge Summary by Scanning, Generic Provider (05/15/2025 00:00)   See discharge assessment below for further details     Two attempts were made to reach patient within two business days after discharge. Left voicemail with contact information for patient to call back with any non-emergent questions or concerns.   
no
unable to assess